# Patient Record
Sex: FEMALE | Race: WHITE | NOT HISPANIC OR LATINO | Employment: FULL TIME | URBAN - METROPOLITAN AREA
[De-identification: names, ages, dates, MRNs, and addresses within clinical notes are randomized per-mention and may not be internally consistent; named-entity substitution may affect disease eponyms.]

---

## 2017-05-20 ENCOUNTER — HOSPITAL ENCOUNTER (EMERGENCY)
Facility: CLINIC | Age: 22
Discharge: HOME | End: 2017-05-20
Attending: EMERGENCY MEDICINE | Admitting: EMERGENCY MEDICINE
Payer: COMMERCIAL

## 2017-05-20 VITALS
RESPIRATION RATE: 16 BRPM | HEART RATE: 78 BPM | TEMPERATURE: 97.9 F | SYSTOLIC BLOOD PRESSURE: 102 MMHG | OXYGEN SATURATION: 98 % | DIASTOLIC BLOOD PRESSURE: 79 MMHG | WEIGHT: 121.7 LBS

## 2017-05-20 DIAGNOSIS — H81.399 PERIPHERAL VERTIGO, UNSPECIFIED LATERALITY: ICD-10-CM

## 2017-05-20 LAB
ANION GAP SERPL CALCULATED.3IONS-SCNC: 5 MMOL/L (ref 3–14)
BASOPHILS # BLD AUTO: 0 10E9/L (ref 0–0.2)
BASOPHILS NFR BLD AUTO: 0.4 %
BUN SERPL-MCNC: 17 MG/DL (ref 7–30)
CALCIUM SERPL-MCNC: 9.1 MG/DL (ref 8.5–10.1)
CHLORIDE SERPL-SCNC: 109 MMOL/L (ref 94–109)
CO2 SERPL-SCNC: 28 MMOL/L (ref 20–32)
CREAT SERPL-MCNC: 0.61 MG/DL (ref 0.52–1.04)
DIFFERENTIAL METHOD BLD: NORMAL
EOSINOPHIL # BLD AUTO: 0.1 10E9/L (ref 0–0.7)
EOSINOPHIL NFR BLD AUTO: 0.9 %
ERYTHROCYTE [DISTWIDTH] IN BLOOD BY AUTOMATED COUNT: 12.1 % (ref 10–15)
GFR SERPL CREATININE-BSD FRML MDRD: ABNORMAL ML/MIN/1.7M2
GLUCOSE SERPL-MCNC: 108 MG/DL (ref 70–99)
HCG UR QL: NEGATIVE
HCT VFR BLD AUTO: 44.2 % (ref 35–47)
HGB BLD-MCNC: 15 G/DL (ref 11.7–15.7)
IMM GRANULOCYTES # BLD: 0 10E9/L (ref 0–0.4)
IMM GRANULOCYTES NFR BLD: 0.1 %
INTERNAL QC OK POCT: YES
LYMPHOCYTES # BLD AUTO: 1.8 10E9/L (ref 0.8–5.3)
LYMPHOCYTES NFR BLD AUTO: 23.2 %
MAGNESIUM SERPL-MCNC: 2.1 MG/DL (ref 1.6–2.3)
MCH RBC QN AUTO: 30.9 PG (ref 26.5–33)
MCHC RBC AUTO-ENTMCNC: 33.9 G/DL (ref 31.5–36.5)
MCV RBC AUTO: 91 FL (ref 78–100)
MONOCYTES # BLD AUTO: 0.5 10E9/L (ref 0–1.3)
MONOCYTES NFR BLD AUTO: 6 %
NEUTROPHILS # BLD AUTO: 5.3 10E9/L (ref 1.6–8.3)
NEUTROPHILS NFR BLD AUTO: 69.4 %
NRBC # BLD AUTO: 0 10*3/UL
NRBC BLD AUTO-RTO: 0 /100
PLATELET # BLD AUTO: 271 10E9/L (ref 150–450)
POTASSIUM SERPL-SCNC: 3.9 MMOL/L (ref 3.4–5.3)
RBC # BLD AUTO: 4.86 10E12/L (ref 3.8–5.2)
SODIUM SERPL-SCNC: 142 MMOL/L (ref 133–144)
WBC # BLD AUTO: 7.6 10E9/L (ref 4–11)

## 2017-05-20 PROCEDURE — 80048 BASIC METABOLIC PNL TOTAL CA: CPT | Performed by: EMERGENCY MEDICINE

## 2017-05-20 PROCEDURE — 85025 COMPLETE CBC W/AUTO DIFF WBC: CPT | Performed by: EMERGENCY MEDICINE

## 2017-05-20 PROCEDURE — 83735 ASSAY OF MAGNESIUM: CPT | Performed by: EMERGENCY MEDICINE

## 2017-05-20 PROCEDURE — 99283 EMERGENCY DEPT VISIT LOW MDM: CPT | Performed by: EMERGENCY MEDICINE

## 2017-05-20 PROCEDURE — 25000131 ZZH RX MED GY IP 250 OP 636 PS 637: Performed by: EMERGENCY MEDICINE

## 2017-05-20 PROCEDURE — 99284 EMERGENCY DEPT VISIT MOD MDM: CPT | Mod: Z6 | Performed by: EMERGENCY MEDICINE

## 2017-05-20 PROCEDURE — 81025 URINE PREGNANCY TEST: CPT | Performed by: EMERGENCY MEDICINE

## 2017-05-20 RX ORDER — MECLIZINE HCL 12.5 MG 12.5 MG/1
25 TABLET ORAL 3 TIMES DAILY PRN
Qty: 30 TABLET | Refills: 0 | Status: SHIPPED | OUTPATIENT
Start: 2017-05-20 | End: 2023-08-14

## 2017-05-20 RX ORDER — MECLIZINE HYDROCHLORIDE 25 MG/1
50 TABLET ORAL ONCE
Status: COMPLETED | OUTPATIENT
Start: 2017-05-20 | End: 2017-05-20

## 2017-05-20 RX ADMIN — MECLIZINE HYDROCHLORIDE 50 MG: 25 TABLET ORAL at 16:58

## 2017-05-20 ASSESSMENT — ENCOUNTER SYMPTOMS
SORE THROAT: 0
HEADACHES: 0
DYSURIA: 0
NAUSEA: 0
FEVER: 0
RHINORRHEA: 0
SHORTNESS OF BREATH: 1
ABDOMINAL PAIN: 0
PALPITATIONS: 0
NUMBNESS: 0
VOMITING: 0
WEAKNESS: 0
CHILLS: 0
DIZZINESS: 1

## 2017-05-20 NOTE — ED PROVIDER NOTES
"    [facility] [place] ([facility])    May 20, 2017    ED 20   History     Chief Complaint   Patient presents with     Dizziness     intermittent dizziness, disorientation, & SOB first noticed Monday.      HPI  Lina Gaston is a 22 year old female who presents to the emergency department for evaluation of intermittent dizziness and disorientation since Monday (5 days ago). Patient relates a sensation of room spinning that is present at rest but slightly worse with movement or position changes. She states that she first noticed the symptoms when she was sitting still. She denies nausea, vomiting, ear pain, tinnitus, sinus pressure, rhinorrhea, extremity paresthesias or weakness, blurred vision, or palpitations. She reports shortness of breath is present with the episodes of disorientation and suspects that this may be due to anxiety. Patient's mother reports that she brought the patient into the emergency department because she would \"check out and not answer questions\" and would have an abnormal syntax at times. Patient's mother reports that the patient is a vegetarian and has had intermittent issues with hypokalemia. She reports that her last menstrual period was 1 week ago. No headache. No recent trauma or falls.     I have reviewed the Medications, Allergies, Past Medical and Surgical History, and Social History in the [employer] system.    PAST MEDICAL HISTORY: History reviewed. No pertinent past medical history.    PAST SURGICAL HISTORY: History reviewed. No pertinent surgical history.    FAMILY HISTORY: No family history on file.    SOCIAL HISTORY:   Social History   Substance Use Topics     Smoking status: Never Smoker     Smokeless tobacco: Not on file     Alcohol use Yes      Comment: socially       Discharge Medication List as of 5/20/2017  6:13 PM      START taking these medications    Details   meclizine (ANTIVERT) 12.5 MG tablet Take 2 tablets (25 mg) by mouth 3 times daily as needed " for dizziness, Disp-30 tablet, R-0, Local Print            No Known Allergies     Review of Systems   Constitutional: Negative for chills and fever.   HENT: Negative for congestion, ear pain, hearing loss, rhinorrhea and sore throat.    Respiratory: Positive for shortness of breath.    Cardiovascular: Negative for chest pain and palpitations.   Gastrointestinal: Negative for abdominal pain, nausea and vomiting.   Genitourinary: Negative for dysuria and menstrual problem.   Skin: Negative for rash.   Neurological: Positive for dizziness. Negative for syncope, weakness, numbness and headaches.       Physical Exam   BP: 124/83  Pulse: 93  Temp: 97.9  F (36.6  C)  Resp: 16  Weight: 55.2 kg (121 lb 11.2 oz)  SpO2: 98 %  Physical Exam   Constitutional: She is oriented to person, place, and time. No distress.   HENT:   Head: Atraumatic.   Mouth/Throat: Oropharynx is clear and moist. No oropharyngeal exudate.   Eyes: Pupils are equal, round, and reactive to light. No scleral icterus.   Cardiovascular: Normal heart sounds and intact distal pulses.    Pulmonary/Chest: Breath sounds normal. No respiratory distress.   Abdominal: Soft. There is no tenderness.   Musculoskeletal: She exhibits no edema or tenderness.   Neurological: She is alert and oriented to person, place, and time. No cranial nerve deficit. She exhibits normal muscle tone. Coordination normal.   Skin: Skin is warm. No rash noted. She is not diaphoretic.       ED Course     ED Course     Procedures       4:28 PM  The patient was seen and examined by Cass Beltran MD in Room 20.          Critical Care time:  none               Labs Ordered and Resulted from Time of ED Arrival Up to the Time of Departure from the ED   BASIC METABOLIC PANEL - Abnormal; Notable for the following:        Result Value    Glucose 108 (*)     All other components within normal limits   HCG QUAL URINE POCT - Normal   CBC WITH PLATELETS DIFFERENTIAL   MAGNESIUM            Assessments &  Plan (with Medical Decision Making)   The patient's symptoms have been intermittent for several days.  They've been more constant today, though worse with movement.  Neurologic exam is completely normal.  Given the intermittent nature of her symptoms, this seems likely related to peripheral vertigo.  She was in normal sinus rhythm while here.  I did do some basic labs, which were unremarkable.  UPT is negative.  She is not short of breath here, my suspicion for PE is low.  She was given a dose of meclizine here, and on repeat evaluation she states she's tired but that her dizziness is completely resolved.  I do think she is safe for discharge home.  No evidence to suggest central source.  She'll be given outpatient prescription for meclizine, and precautions, such as avoiding driving or doing other dangerous things until symptoms have completely resolved were discussed.  I do recommend she follow up with her clinic doctor in a few days if her symptoms have not completely resolved.  She was instructed to return to the ER with any new or worsening symptoms.  She verbalized understanding and is agreeable with the plan.    This part of the document was transcribed by Judit Yang Medical Scribe.      I have reviewed the nursing notes.    I have reviewed the findings, diagnosis, plan and need for follow up with the patient.    Discharge Medication List as of 5/20/2017  6:13 PM      START taking these medications    Details   meclizine (ANTIVERT) 12.5 MG tablet Take 2 tablets (25 mg) by mouth 3 times daily as needed for dizziness, Disp-30 tablet, R-0, Local Print             Final diagnoses:   Peripheral vertigo, unspecified laterality     I, Sherif Ramos, am serving as a trained medical scribe to document services personally performed by Cass Beltran MD, based on the provider's statements to me.   I, Cass Beltran MD, was physically present and have reviewed and verified the accuracy of this note documented  by Sherif Ramos.  5/20/2017   [facility], [place], EMERGENCY DEPARTMENT     Cass Beltran MD  05/21/17 0353

## 2017-05-20 NOTE — DISCHARGE INSTRUCTIONS
Benign Positional Vertigo    The inner ear is located behind the middle ear. It is a part of the balance center of the body. It contains small calcium particles within fluid filled canals (semi-circular canals). These particles can move out of position as a result of aging, head trauma or disease of the inner ear. Once that happens, movement of the head into certain positions may cause the particles to stimulate the inner ear and create the feeling of vertigo.  Vertigo is a false feeling of motion (as if you or the room is spinning). A vertigo attack may cause sudden nausea, vomiting and heavy sweating. Severe vertigo causes a loss of balance and may result in falling. During an attack of vertigo, head movement and body position changes will worsen symptoms.  An episode of vertigo may last seconds, minutes or hours. Once you are over the first episode of vertigo, it may never return. Sometimes symptoms recur off and on over several weeks or longer.  Home Care:    If symptoms are severe, rest quietly in bed. Change positions slowly. There is usually one position that will feel best, such as lying on one side or lying on your back with your head slightly raised on pillows.    Do not drive or work with dangerous machinery for one week after symptoms disappear, in case of a sudden return of symptoms.    Take medicine as prescribed to relieve your symptoms. Unless another medicine was prescribed for nausea, vomiting and vertigo, you may use over-the-counter motion sickness pills, such as meclizine (Bonine, Bonamine, Antivert) or dimenhydrinate (Dramamine).  Follow Up  with your doctor or as directed by our staff. Report any persistent ringing in the ear or hearing loss to your doctor.  [NOTE: If you had a CT or MRI scan, it will be reviewed by a specialist. You will be notified of any new findings that may affect your care.]  Get Prompt Medical Attention  if any of the following occur:    Worsening of vertigo not  controlled by the medicine prescribed    Repeated vomiting not controlled by the medicine prescribed    Increased weakness or fainting    Severe headache or unusual drowsiness or confusion    Weakness of an arm or leg or one side of the face    Difficulty with speech or vision    Seizure    4611-7730 [employer]. [address] [place] [id number]. All rights reserved. This information is not intended as a substitute for professional medical care. Always follow your healthcare professional's instructions.      Please make an appointment to follow up with Your Primary Care Provider in 3-4 days if not improving. You may benefit from being seen at the [facility].

## 2017-05-20 NOTE — ED AVS SNAPSHOT
[facility], Emergency Department    [address]    [facility] [address]    Phone:  [phone]    Fax:  [phone]                                       Lina Gaston   MRN: [id number]    Department:  [facility], Emergency Department   Date of Visit:  5/20/2017           Patient Information     Date Of Birth          1995        Your diagnoses for this visit were:     Peripheral vertigo, unspecified laterality        You were seen by Cass Beltran MD.        Discharge Instructions         Benign Positional Vertigo    The inner ear is located behind the middle ear. It is a part of the balance center of the body. It contains small calcium particles within fluid filled canals (semi-circular canals). These particles can move out of position as a result of aging, head trauma or disease of the inner ear. Once that happens, movement of the head into certain positions may cause the particles to stimulate the inner ear and create the feeling of vertigo.  Vertigo is a false feeling of motion (as if you or the room is spinning). A vertigo attack may cause sudden nausea, vomiting and heavy sweating. Severe vertigo causes a loss of balance and may result in falling. During an attack of vertigo, head movement and body position changes will worsen symptoms.  An episode of vertigo may last seconds, minutes or hours. Once you are over the first episode of vertigo, it may never return. Sometimes symptoms recur off and on over several weeks or longer.  Home Care:    If symptoms are severe, rest quietly in bed. Change positions slowly. There is usually one position that will feel best, such as lying on one side or lying on your back with your head slightly raised on pillows.    Do not drive or work with dangerous machinery for one week after symptoms disappear, in case of a sudden return of symptoms.    Take medicine as prescribed to relieve your symptoms. Unless another medicine was prescribed for  nausea, vomiting and vertigo, you may use over-the-counter motion sickness pills, such as meclizine (Bonine, Bonamine, Antivert) or dimenhydrinate (Dramamine).  Follow Up  with your doctor or as directed by our staff. Report any persistent ringing in the ear or hearing loss to your doctor.  [NOTE: If you had a CT or MRI scan, it will be reviewed by a specialist. You will be notified of any new findings that may affect your care.]  Get Prompt Medical Attention  if any of the following occur:    Worsening of vertigo not controlled by the medicine prescribed    Repeated vomiting not controlled by the medicine prescribed    Increased weakness or fainting    Severe headache or unusual drowsiness or confusion    Weakness of an arm or leg or one side of the face    Difficulty with speech or vision    Seizure    9506-8697 [employer]. [address] [place] [address]. All rights reserved. This information is not intended as a substitute for professional medical care. Always follow your healthcare professional's instructions.      Please make an appointment to follow up with Your Primary Care Provider in 3-4 days if not improving. You may benefit from being seen at the [facility].      24 Hour Appointment Hotline       To make an appointment at any [facility] clinic, call [phone] ([phone]). If you don't have a family doctor or clinic, we will help you find one. [facility] clinics are conveniently located to serve the needs of you and your family.             Review of your medicines      START taking        Dose / Directions Last dose taken    meclizine 12.5 MG tablet   Commonly known as:  ANTIVERT   Dose:  25 mg   Quantity:  30 tablet        Take 2 tablets (25 mg) by mouth 3 times daily as needed for dizziness   Refills:  0                Prescriptions were sent or printed at these locations (1 Prescription)                   Other Prescriptions                Printed at  "Department/Unit printer (1 of 1)         meclizine (ANTIVERT) 12.5 MG tablet                Procedures and tests performed during your visit     Basic metabolic panel    CBC with platelets differential    Magnesium    hCG qual urine POCT      Orders Needing Specimen Collection     None      Pending Results     No orders found from 2017 to 2017.            Pending Culture Results     No orders found from 2017 to 2017.            Pending Results Instructions     If you had any lab results that were not finalized at the time of your Discharge, you can call the ED Lab Result RN at [phone]. You will be contacted by this team for any positive Lab results or changes in treatment. The nurses are available 7 days a week from 10A to 6:30P.  You can leave a message 24 hours per day and they will return your call.        Thank you for choosing [facility]       Thank you for choosing [facility] for your care. Our goal is always to provide you with excellent care. Hearing back from our patients is one way we can continue to improve our services. Please take a few minutes to complete the written survey that you may receive in the mail after you visit with us. Thank you!        real[employer]D Information     [employer] lets you send messages to your doctor, view your test results, renew your prescriptions, schedule appointments and more. To sign up, go to [email or web address] . Click on \"Log in\" on the left side of the screen, which will take you to the Welcome page. Then click on \"Sign up Now\" on the right side of the page.     You will be asked to enter the access code listed below, as well as some personal information. Please follow the directions to create your username and password.     Your access code is: [email or web address]  Expires: 2017  6:13 PM     Your access code will  in 90 days. If you need help or a new code, please call your [facility] clinic or [phone].        Care EveryWhere ID     This is " your Care EveryWhere ID. This could be used by other organizations to access your [facility] medical records  [id number]        After Visit Summary       This is your record. Keep this with you and show to your community pharmacist(s) and doctor(s) at your next visit.

## 2017-05-20 NOTE — ED AVS SNAPSHOT
[facility], Emergency Department    [address]    [facility] [address]    Phone:  [phone]    Fax:  [phone]                                       Lina Gaston   MRN: [id number]    Department:  [facility], Emergency Department   Date of Visit:  5/20/2017           After Visit Summary Signature Page     I have received my discharge instructions, and my questions have been answered. I have discussed any challenges I see with this plan with the nurse or doctor.    ..........................................................................................................................................  Patient/Patient Representative Signature      ..........................................................................................................................................  Patient Representative Print Name and Relationship to Patient    ..................................................               ................................................  Date                                            Time    ..........................................................................................................................................  Reviewed by Signature/Title    ...................................................              ..............................................  Date                                                            Time

## 2023-08-14 ENCOUNTER — OFFICE VISIT (OUTPATIENT)
Dept: FAMILY MEDICINE | Facility: CLINIC | Age: 28
End: 2023-08-14
Payer: COMMERCIAL

## 2023-08-14 VITALS
WEIGHT: 133 LBS | SYSTOLIC BLOOD PRESSURE: 122 MMHG | HEIGHT: 63 IN | TEMPERATURE: 98 F | RESPIRATION RATE: 16 BRPM | BODY MASS INDEX: 23.57 KG/M2 | DIASTOLIC BLOOD PRESSURE: 74 MMHG | OXYGEN SATURATION: 100 % | HEART RATE: 88 BPM

## 2023-08-14 DIAGNOSIS — H69.93 DYSFUNCTION OF BOTH EUSTACHIAN TUBES: Primary | ICD-10-CM

## 2023-08-14 PROCEDURE — 99203 OFFICE O/P NEW LOW 30 MIN: CPT | Performed by: FAMILY MEDICINE

## 2023-08-14 RX ORDER — FLUTICASONE PROPIONATE 50 MCG
1 SPRAY, SUSPENSION (ML) NASAL 2 TIMES DAILY
Qty: 16 G | Refills: 0 | Status: SHIPPED | OUTPATIENT
Start: 2023-08-14 | End: 2024-09-09

## 2023-08-14 RX ORDER — PSEUDOEPHEDRINE HCL 120 MG/1
120 TABLET, FILM COATED, EXTENDED RELEASE ORAL EVERY 12 HOURS
Qty: 30 TABLET | Refills: 0 | Status: SHIPPED | OUTPATIENT
Start: 2023-08-14

## 2023-08-14 NOTE — PROGRESS NOTES
"  Assessment & Plan     Dysfunction of both eustachian tubes - discussed likely diagnosis for her current symptoms. Advised treatment as below.   - pseudoePHEDrine (SUDAFED) 120 MG 12 hr tablet; Take 1 tablet (120 mg) by mouth every 12 hours  - fluticasone (FLONASE) 50 MCG/ACT nasal spray; Spray 1 spray into both nostrils 2 times daily    Ana Peres MD  [facility] Institute[facility]TH[facility] Mills is a 28 year old, presenting for the following health issues:  Dizziness and Ear Problem (pressure)        8/14/2023     1:15 PM   Additional Questions   Roomed by Ora RECINOS       History of Present Illness       Reason for visit:  Dizziness  Symptom onset:  3-7 days ago  Symptoms include:  Dizziness, fatigue  Symptom intensity:  Moderate  Symptom progression:  Staying the same  Had these symptoms before:  No    She eats 2-3 servings of fruits and vegetables daily.She consumes 1 sweetened beverage(s) daily.She exercises with enough effort to increase her heart rate 9 or less minutes per day.  She exercises with enough effort to increase her heart rate 3 or less days per week.   She is taking medications regularly.       Cold a week and a half ago.   Lightheadedness, no vertigo.   Notes with position changes.     No double vision.   History of BPPV.     Well hydrated, normal diet.       Review of Systems   Constitutional, HEENT, cardiovascular, pulmonary, gi and gu systems are negative, except as otherwise noted.      Objective    /74 (Cuff Size: Adult Regular)   Pulse 88   Temp 98  F (36.7  C) (Oral)   Resp 16   Ht 1.6 m (5' 3\")   Wt 60.3 kg (133 lb)   SpO2 100%   BMI 23.56 kg/m    Body mass index is 23.56 kg/m .  Physical Exam   GENERAL: healthy, alert and no distress  EYES: Eyes grossly normal to inspection, no nystagmus, PERRL and conjunctivae and sclerae normal  HENT: serous effusion bilateral middle ears, nose and mouth without ulcers or lesions  NECK: no adenopathy, no asymmetry, " masses, or scars and thyroid normal to palpation  RESP: lungs clear to auscultation - no rales, rhonchi or wheezes  CV: regular rate and rhythm, normal S1 S2, no S3 or S4, no murmur, click or rub, no peripheral edema and peripheral pulses strong  NEURO: Normal strength and tone, sensory exam grossly normal, mentation intact, Romberg normal, and rapid alternating movements normal

## 2023-09-17 ENCOUNTER — HEALTH MAINTENANCE LETTER (OUTPATIENT)
Age: 28
End: 2023-09-17

## 2024-01-12 ENCOUNTER — IMMUNIZATION (OUTPATIENT)
Dept: FAMILY MEDICINE | Facility: CLINIC | Age: 29
End: 2024-01-12
Payer: COMMERCIAL

## 2024-01-12 DIAGNOSIS — Z23 ENCOUNTER FOR IMMUNIZATION: Primary | ICD-10-CM

## 2024-01-12 PROCEDURE — 99207 PR NO CHARGE NURSE ONLY: CPT

## 2024-01-12 PROCEDURE — 90480 ADMN SARSCOV2 VAC 1/ONLY CMP: CPT

## 2024-01-12 PROCEDURE — 91320 SARSCV2 VAC 30MCG TRS-SUC IM: CPT

## 2024-01-12 NOTE — PROGRESS NOTES
Prior to immunization administration, verified patients identity using patient s name and date of birth. Please see Immunization Activity for additional information.     Screening Questionnaire for Adult Immunization    Are you sick today?   No   Do you have allergies to medications, food, a vaccine component or latex?   No   Have you ever had a serious reaction after receiving a vaccination?   No   Do you have a long-term health problem with heart, lung, kidney, or metabolic disease (e.g., diabetes), asthma, a blood disorder, no spleen, complement component deficiency, a cochlear implant, or a spinal fluid leak?  Are you on long-term aspirin therapy?   No   Do you have cancer, leukemia, HIV/AIDS, or any other immune system problem?   No   Do you have a parent, brother, or sister with an immune system problem?   No   In the past 3 months, have you taken medications that affect  your immune system, such as prednisone, other steroids, or anticancer drugs; drugs for the treatment of rheumatoid arthritis, Crohn s disease, or psoriasis; or have you had radiation treatments?   No   Have you had a seizure, or a brain or other nervous system problem?   No   During the past year, have you received a transfusion of blood or blood    products, or been given immune (gamma) globulin or antiviral drug?   No   For women: Are you pregnant or is there a chance you could become       pregnant during the next month?   No   Have you received any vaccinations in the past 4 weeks?   No     Immunization questionnaire answers were all negative.    I have reviewed the following standing orders:   This patient is due and qualifies for the Covid-19 vaccine.     Click here for COVID-19 Standing Order    I have reviewed the vaccines inclusion and exclusion criteria; No concerns regarding eligibility.     Patient instructed to remain in clinic for 15 minutes afterwards, and to report any adverse reactions.     Screening performed by NELSON  GOLDEN, RN on 1/12/2024 at 2:31 PM.

## 2024-09-09 ENCOUNTER — PRE VISIT (OUTPATIENT)
Dept: ORTHOPEDICS | Facility: CLINIC | Age: 29
End: 2024-09-09

## 2024-09-09 ENCOUNTER — OFFICE VISIT (OUTPATIENT)
Dept: ORTHOPEDICS | Facility: CLINIC | Age: 29
End: 2024-09-09
Payer: COMMERCIAL

## 2024-09-09 DIAGNOSIS — M77.8 TOE TENDONITIS: Primary | ICD-10-CM

## 2024-09-09 PROCEDURE — 99203 OFFICE O/P NEW LOW 30 MIN: CPT | Performed by: FAMILY MEDICINE

## 2024-09-09 NOTE — LETTER
"  9/9/2024      RE: Lina Gaston  [address] [facility] [address]     Dear Colleague,    Thank you for referring your patient, Lina Gaston, to the [facility] [place]. Please see a copy of my visit note below.    ASSESSMENT/PLAN:    (M77.8) Toe tendonitis  (primary encounter diagnosis)  Comment: exam today quite reassuring; likely an extensor tendonitis of big toe; will defer imaging; trial of exercises and topical nsaid; footwear modification reviewed; precautions given; she will send me a mychart at the end of the week if pain persists and I can order an x-ray/ PT; f/up prn  Plan: diclofenac (VOLTAREN) 1 % topical gel          Physician Attestation  I, Tj Small MD, was present with the medical student, Natalie Mccray, MS4, who participated in the service and in the documentation of the note.  I have verified the history and personally performed the physical exam and medical decision making.  I agree with the assessment and plan of care as documented in the note.      Tj Small MD  September 9, 2024  12:37 PM    Pt is a 29 year old female here today for:     HPI:   Left  Foot pain:   Location: Left 1st MTP joint -> dorsal toe  Duration:3 days   Trauma/ Fall? No. Painting and gardening with a lot of squatting in the past few days.   Able to walk? Yes but painful toe off. \"Sharp pain\"  Swelling? none   Bruising? none  Numbness/ Tingling? None   Weakness? None   Instability? None  Snapping/Clicking? Yes during toe off while walking.  Imaging? No   Treatment? Ice     Maybe an injury in undergrad - tendonitis?   Played [employer]       No past medical history on file.   No past surgical history on file.   Current Outpatient Medications   Medication Sig Dispense Refill     fluticasone (FLONASE) 50 MCG/ACT nasal spray Spray 1 spray into both nostrils 2 times daily 16 g 0     pseudoePHEDrine (SUDAFED) 120 MG 12 hr tablet Take 1 tablet (120 mg) by " mouth every 12 hours 30 tablet 0      No Known Allergies   ROS:   Gen- no fevers/chills   Rheum - no morning stiffness   Derm - no rash/ redness   Neuro - no numbness, no tingling   Remainder of ROS negative.     Exam:   There were no vitals taken for this visit.     L Foot:   Inspection: Swelling - No; Bruising - no   Sensation: intact    ROM: full flexion/ extension of great toe; No pain w/ passive hyperflexion or hyperextension   Strength: 5/5 ; No Pain w/ resisted flexion or extension  Bony tenderness:  - NONE at L great toe MTP/ sesamoids/ IP joint  Ligaments Tenderness: intact; no pain w/ varus/valgus toe stress  Tendons: intact; no TTP over EHL   Maneuvers: Hop - Negative      Again, thank you for allowing me to participate in the care of your patient.      Sincerely,    Tj Small MD

## 2024-09-09 NOTE — PATIENT INSTRUCTIONS
General Tips    If you find that these exercises are too difficult or too painful, slowing the exercise down or reducing the range of motion (distance traveled during the exercise) may help immediately. If not, reducing the number of repetitions or sets is the next step.  Many exercises will feel awkward and may cause mild aches and pains in the beginning. This should subside with time, so keep it up.   The form described for each exercise is not one size fits all. There will be small variations that everyone has to make. It will take time to figure out what works best for you.  As the exercises get easier, add range of motion, sets, or repetitions. The goal is to progressively increase the total volume done each week!   Perform each set of these exercises to a RPE (rate of perceived exertion) of 7-8. This is why we have repetition ranges listed. If the exercise calls for 10-15 repetitions and you would rate your RPE at a 7-8 at rep 10, you can conclude the set.            Towel Scrunches: Lay a hand towel down on a flat smooth floor (wood, tile, vinyl, etc.). Sit on a chair or stand with your toes over the edge of the towel. To the best of your ability, lift up and spread out your toes to grab as much of the towel as possible. Curl your toes as far as possible to pull the towel under your foot. Release the towel and repeat this motion until you reach the opposite end of the towel.    Towel length 3x. Rest until the burn has subsided.              Toe Yoga: Sit or stand with your feet flat on the floor. Using your affected foot, attempt to lift your big toe off the ground while keeping your other four toes down. Allow your big to drop back to the ground. Then, lift your other four toes while keeping the big toe on the ground. Drop your four toes down and repeat this sequence.    2 sets of 20-25. Rest 1-2 minutes between sets.          Single Leg Balance:  front of a chair or stable structure to grab if  needed. With bare feet, lift your non-affected knee forward and balance on the affected leg. Try to spread your toes out to   the floor  so you have a stable base. If you feel you may fall, grab the chair in front of you to regain your balance.     3 sets of 30 seconds each leg.  1-2 minutes of rest between sets.

## 2024-09-09 NOTE — PROGRESS NOTES
"ASSESSMENT/PLAN:    (M77.8) Toe tendonitis  (primary encounter diagnosis)  Comment: exam today quite reassuring; likely an extensor tendonitis of big toe; will defer imaging; trial of exercises and topical nsaid; footwear modification reviewed; precautions given; she will send me a mychart at the end of the week if pain persists and I can order an x-ray/ PT; f/up prn  Plan: diclofenac (VOLTAREN) 1 % topical gel          Physician Attestation   I, Tj Small MD, was present with the medical student, Natalie Mccray, MS4, who participated in the service and in the documentation of the note.  I have verified the history and personally performed the physical exam and medical decision making.  I agree with the assessment and plan of care as documented in the note.      Tj Small MD  September 9, 2024  12:37 PM    Pt is a 29 year old female here today for:     HPI:   Left  Foot pain:   Location: Left 1st MTP joint -> dorsal toe  Duration:3 days   Trauma/ Fall? No. Painting and gardening with a lot of squatting in the past few days.   Able to walk? Yes but painful toe off. \"Sharp pain\"  Swelling? none   Bruising? none  Numbness/ Tingling? None   Weakness? None   Instability? None  Snapping/Clicking? Yes during toe off while walking.  Imaging? No   Treatment? Ice     Maybe an injury in undergrad - tendonitis?   [employer]ayed [employer]       No past medical history on file.   No past surgical history on file.   Current Outpatient Medications   Medication Sig Dispense Refill    fluticasone (FLONASE) 50 MCG/ACT nasal spray Spray 1 spray into both nostrils 2 times daily 16 g 0    pseudoePHEDrine (SUDAFED) 120 MG 12 hr tablet Take 1 tablet (120 mg) by mouth every 12 hours 30 tablet 0      No Known Allergies   ROS:   Gen- no fevers/chills   Rheum - no morning stiffness   Derm - no rash/ redness   Neuro - no numbness, no tingling   Remainder of ROS negative.     Exam:   There were no vitals taken for this visit.     L " Foot:   Inspection: Swelling - No; Bruising - no   Sensation: intact    ROM: full flexion/ extension of great toe; No pain w/ passive hyperflexion or hyperextension   Strength: 5/5 ; No Pain w/ resisted flexion or extension  Bony tenderness:  - NONE at L great toe MTP/ sesamoids/ IP joint  Ligaments Tenderness: intact; no pain w/ varus/valgus toe stress  Tendons: intact; no TTP over EHL   Maneuvers: Hop - Negative

## 2024-09-09 NOTE — TELEPHONE ENCOUNTER
DIAGNOSIS: Pain in big toe, left foot. Clicking feeling/sound in the area when extending and moving the big toe. Difficulty walking because of pain. No pain when resting, only when moving.     APPOINTMENT DATE: 9.9.24   NOTES STATUS DETAILS   MEDICATION LIST Internal

## 2024-11-10 ENCOUNTER — HEALTH MAINTENANCE LETTER (OUTPATIENT)
Age: 29
End: 2024-11-10